# Patient Record
Sex: FEMALE | Race: WHITE | HISPANIC OR LATINO | Employment: FULL TIME | URBAN - METROPOLITAN AREA
[De-identification: names, ages, dates, MRNs, and addresses within clinical notes are randomized per-mention and may not be internally consistent; named-entity substitution may affect disease eponyms.]

---

## 2024-05-07 ENCOUNTER — OFFICE VISIT (OUTPATIENT)
Dept: URGENT CARE | Facility: URGENT CARE | Age: 19
End: 2024-05-07
Payer: COMMERCIAL

## 2024-05-07 VITALS
HEART RATE: 119 BPM | OXYGEN SATURATION: 100 % | SYSTOLIC BLOOD PRESSURE: 141 MMHG | TEMPERATURE: 100.7 F | WEIGHT: 213 LBS | DIASTOLIC BLOOD PRESSURE: 86 MMHG

## 2024-05-07 DIAGNOSIS — R50.9 FEVER, UNSPECIFIED FEVER CAUSE: Primary | ICD-10-CM

## 2024-05-07 LAB
ALBUMIN UR-MCNC: NEGATIVE MG/DL
APPEARANCE UR: CLEAR
BILIRUB UR QL STRIP: NEGATIVE
COLOR UR AUTO: YELLOW
DEPRECATED S PYO AG THROAT QL EIA: NEGATIVE
FLUAV AG SPEC QL IA: NEGATIVE
FLUBV AG SPEC QL IA: NEGATIVE
GLUCOSE UR STRIP-MCNC: NEGATIVE MG/DL
HGB UR QL STRIP: NEGATIVE
KETONES UR STRIP-MCNC: NEGATIVE MG/DL
LEUKOCYTE ESTERASE UR QL STRIP: ABNORMAL
NITRATE UR QL: NEGATIVE
PH UR STRIP: 7 [PH] (ref 5–7)
RBC #/AREA URNS AUTO: ABNORMAL /HPF
SP GR UR STRIP: 1.02 (ref 1–1.03)
SQUAMOUS #/AREA URNS AUTO: ABNORMAL /LPF
UROBILINOGEN UR STRIP-ACNC: 1 E.U./DL
WBC #/AREA URNS AUTO: ABNORMAL /HPF

## 2024-05-07 PROCEDURE — 87804 INFLUENZA ASSAY W/OPTIC: CPT | Performed by: PHYSICIAN ASSISTANT

## 2024-05-07 PROCEDURE — 87635 SARS-COV-2 COVID-19 AMP PRB: CPT | Performed by: PHYSICIAN ASSISTANT

## 2024-05-07 PROCEDURE — 81001 URINALYSIS AUTO W/SCOPE: CPT | Performed by: PHYSICIAN ASSISTANT

## 2024-05-07 PROCEDURE — 87651 STREP A DNA AMP PROBE: CPT | Performed by: PHYSICIAN ASSISTANT

## 2024-05-07 PROCEDURE — 99203 OFFICE O/P NEW LOW 30 MIN: CPT | Performed by: PHYSICIAN ASSISTANT

## 2024-05-07 NOTE — LETTER
May 7, 2024      Smita Prince  [address] [place] [address]        To Whom It May Concern:    Smita Prince  was seen on 5/7.  Please excuse her  until 5/9 due to illness.        Sincerely,        Lai Williamson PA-C

## 2024-05-08 DIAGNOSIS — J02.0 STREPTOCOCCAL PHARYNGITIS: Primary | ICD-10-CM

## 2024-05-08 LAB
GROUP A STREP BY PCR: DETECTED
SARS-COV-2 RNA RESP QL NAA+PROBE: NEGATIVE

## 2024-05-08 RX ORDER — PENICILLIN V POTASSIUM 500 MG/1
500 TABLET, FILM COATED ORAL 2 TIMES DAILY
Qty: 20 TABLET | Refills: 0 | Status: SHIPPED | OUTPATIENT
Start: 2024-05-08 | End: 2024-05-18

## 2024-05-08 NOTE — PROGRESS NOTES
SUBJECTIVE:   Smita Prince is a 18 year old female presenting with a chief complaint of fever, chills, sore throat, headache, body aches, fatigue, and nausea.  Onset of symptoms was 1 day(s) ago.  Course of illness is waxing and waning.    Severity moderate  Current and Associated symptoms: as above  Treatment measures tried include Tylenol/Ibuprofen.  Predisposing factors include None.    No past medical history on file.  No current outpatient medications on file.     Social History     Tobacco Use    Smoking status: Not on file    Smokeless tobacco: Not on file   Substance Use Topics    Alcohol use: Not on file       ROS:  Review of systems negative except as stated above.    OBJECTIVE:  BP (!) 141/86   Pulse 119   Temp (!) 100.7  F (38.2  C)   Wt 96.6 kg (213 lb)   SpO2 100%   GENERAL APPEARANCE: healthy, alert and no distress  HENT: ear canals and TM's normal.  Nose and mouth without ulcers, erythema or lesions  NECK: supple, nontender, no lymphadenopathy  RESP: lungs clear to auscultation - no rales, rhonchi or wheezes  CV: regular rates and rhythm, normal S1 S2, no murmur noted  NEURO: Normal strength and tone, sensory exam grossly normal,  normal speech and mentation  SKIN: no suspicious lesions or rashes      Results for orders placed or performed in visit on 05/07/24   UA Macroscopic with reflex to Microscopic and Culture - Lab Collect     Status: Abnormal    Specimen: Urine, Midstream   Result Value Ref Range    Color Urine Yellow Colorless, Straw, Light Yellow, Yellow    Appearance Urine Clear Clear    Glucose Urine Negative Negative mg/dL    Bilirubin Urine Negative Negative    Ketones Urine Negative Negative mg/dL    Specific Gravity Urine 1.020 1.003 - 1.035    Blood Urine Negative Negative    pH Urine 7.0 5.0 - 7.0    Protein Albumin Urine Negative Negative mg/dL    Urobilinogen Urine 1.0 0.2, 1.0 E.U./dL    Nitrite Urine Negative Negative    Leukocyte Esterase Urine Trace (A) Negative   UA  Microscopic with Reflex to Culture     Status: Abnormal   Result Value Ref Range    RBC Urine 0-2 0-2 /HPF /HPF    WBC Urine 0-5 0-5 /HPF /HPF    Squamous Epithelials Urine Few (A) None Seen /LPF    Narrative    Urine Culture not indicated   Streptococcus A Rapid Screen w/Reflex to PCR - Clinic Collect     Status: Normal    Specimen: Throat; Swab   Result Value Ref Range    Group A Strep antigen Negative Negative       ASSESSMENT:  (R50.9) Fever  (primary encounter diagnosis)  Plan: Streptococcus A Rapid Screen w/Reflex to PCR -         Clinic Collect, Symptomatic COVID-19 Virus         (Coronavirus) by PCR Nose, Influenza A & B         Antigen - Clinic Collect, UA Macroscopic with         reflex to Microscopic and Culture - Lab         Collect, UA Microscopic with Reflex to Culture,        Group A Streptococcus PCR Throat Swab      Red flags and emergent follow up discussed, and understood by patient  Follow up with PCP if symptoms worsen or fail to improve